# Patient Record
Sex: FEMALE | Race: WHITE | Employment: OTHER | URBAN - METROPOLITAN AREA
[De-identification: names, ages, dates, MRNs, and addresses within clinical notes are randomized per-mention and may not be internally consistent; named-entity substitution may affect disease eponyms.]

---

## 2021-01-28 ENCOUNTER — IMMUNIZATION (OUTPATIENT)
Dept: PRIMARY CARE CLINIC | Age: 86
End: 2021-01-28
Payer: MEDICARE

## 2021-01-28 PROCEDURE — 91300 COVID-19, PFIZER VACCINE 30MCG/0.3ML DOSE: CPT | Performed by: NURSE PRACTITIONER

## 2021-01-28 PROCEDURE — 0001A COVID-19, PFIZER VACCINE 30MCG/0.3ML DOSE: CPT | Performed by: NURSE PRACTITIONER

## 2021-02-18 ENCOUNTER — IMMUNIZATION (OUTPATIENT)
Dept: PRIMARY CARE CLINIC | Age: 86
End: 2021-02-18
Payer: MEDICARE

## 2021-02-18 PROCEDURE — 91300 COVID-19, PFIZER VACCINE 30MCG/0.3ML DOSE: CPT | Performed by: NURSE PRACTITIONER

## 2021-02-18 PROCEDURE — 0002A PR IMM ADMN SARSCOV2 30MCG/0.3ML DIL RECON 2ND DOSE: CPT | Performed by: NURSE PRACTITIONER

## 2022-10-27 ENCOUNTER — APPOINTMENT (OUTPATIENT)
Dept: CT IMAGING | Age: 87
End: 2022-10-27
Payer: MEDICARE

## 2022-10-27 ENCOUNTER — APPOINTMENT (OUTPATIENT)
Dept: GENERAL RADIOLOGY | Age: 87
End: 2022-10-27
Payer: MEDICARE

## 2022-10-27 ENCOUNTER — HOSPITAL ENCOUNTER (EMERGENCY)
Age: 87
Discharge: HOME | End: 2022-10-27
Attending: EMERGENCY MEDICINE
Payer: MEDICARE

## 2022-10-27 VITALS
HEART RATE: 104 BPM | RESPIRATION RATE: 16 BRPM | OXYGEN SATURATION: 97 % | WEIGHT: 160 LBS | BODY MASS INDEX: 27.31 KG/M2 | HEIGHT: 64 IN | DIASTOLIC BLOOD PRESSURE: 93 MMHG | TEMPERATURE: 96.8 F | SYSTOLIC BLOOD PRESSURE: 146 MMHG

## 2022-10-27 DIAGNOSIS — H53.9 VISUAL DISTURBANCE: Primary | ICD-10-CM

## 2022-10-27 PROCEDURE — 99284 EMERGENCY DEPT VISIT MOD MDM: CPT

## 2022-10-27 PROCEDURE — 71046 X-RAY EXAM CHEST 2 VIEWS: CPT

## 2022-10-27 PROCEDURE — 70450 CT HEAD/BRAIN W/O DYE: CPT

## 2022-10-27 ASSESSMENT — ENCOUNTER SYMPTOMS
RHINORRHEA: 0
CONSTIPATION: 0
EYE DISCHARGE: 0
EYE ITCHING: 0
NAUSEA: 0
PHOTOPHOBIA: 0
VOMITING: 0
DIARRHEA: 0
ABDOMINAL PAIN: 0
EYE PAIN: 0
EYE REDNESS: 0
COUGH: 0
SHORTNESS OF BREATH: 0

## 2022-10-27 ASSESSMENT — VISUAL ACUITY: OU: 1

## 2022-10-27 ASSESSMENT — PAIN - FUNCTIONAL ASSESSMENT: PAIN_FUNCTIONAL_ASSESSMENT: NONE - DENIES PAIN

## 2022-10-27 NOTE — ED PROVIDER NOTES
Nazia Arias is a 80 y.o. female    Chief Complaint   Patient presents with    Eye Problem     Vision changes has to keep 1 eye shut in order to see clearly         HPI   Nazia Arias is a 80 y.o. female presenting to the ED for Eye Problem (Vision changes has to keep 1 eye shut in order to see clearly)    History comes primarily from the patient. She is an 49-year-old female presenting for vision distortion starting this morning. Severity is mild. Closing 1 eye makes the symptoms better. He states this morning she experienced visual distortion that has been constant since onset. She is unable to clearly describe the distortion, but denies blurred vision, double vision, floaters. She states when she closes 1 eye or the other her vision clears. She denies trauma to the eyes. Denies trauma to the eye. Denies contact use but states she uses glasses for reading. She endorsed some mild right under eye pain and a mild frontal headache last night, however resolved now. Denies dizziness, shortness of breath, chest pain, abdominal pain, nausea, vomiting, fever, chills. Review of Systems   Constitutional:  Negative for chills and fever. HENT:  Negative for ear pain and rhinorrhea. Eyes:  Positive for visual disturbance. Negative for photophobia, pain, discharge, redness and itching. Respiratory:  Negative for cough and shortness of breath. Cardiovascular:  Negative for chest pain and palpitations. Gastrointestinal:  Negative for abdominal pain, constipation, diarrhea, nausea and vomiting. Genitourinary:  Negative for difficulty urinating and dysuria. Musculoskeletal:  Negative for arthralgias and myalgias. Skin:  Negative for rash. Neurological:  Negative for dizziness and headaches. All other systems reviewed and are negative. Physical Exam  Constitutional:       Appearance: Normal appearance. HENT:      Head: Normocephalic and atraumatic.       Nose: Nose normal.   Eyes: General: Lids are normal. Vision grossly intact. No visual field deficit. Right eye: No foreign body or discharge. Left eye: No foreign body or discharge. Extraocular Movements: Extraocular movements intact. Conjunctiva/sclera: Conjunctivae normal.      Pupils: Pupils are equal, round, and reactive to light. Comments: Snellen test: 20/20 right eye, 20/20 left eye, 20/20 both eyes   Cardiovascular:      Rate and Rhythm: Normal rate and regular rhythm. Pulmonary:      Breath sounds: Normal breath sounds. Abdominal:      General: Abdomen is flat. Palpations: Abdomen is soft. Tenderness: There is no abdominal tenderness. Musculoskeletal:         General: Normal range of motion. Cervical back: Normal range of motion. Skin:     General: Skin is warm and dry. Neurological:      General: No focal deficit present. Mental Status: She is alert and oriented to person, place, and time. Psychiatric:         Behavior: Behavior normal.        Procedures     MDM     Patient presented to the Emergency Department for Eye Problem (Vision changes has to keep 1 eye shut in order to see clearly)    She is an 27-year-old female presenting for vision distortion starting this morning. CT head shows no acute intracranial process. CXR shows no acute process. Patient with 20/20 vision in left eye, right eye, and both eyes together. No signs of trauma to either eyes. Vitals stable. Imaging was discussed with the patient. The plan for discharge with follow up with their PCP and ophthalmologist was discussed with the patient and she agrees with the plan. Return precautions were given. ED Course as of 10/27/22 2306   Thu Oct 27, 2022   1638 Patient is feeling well. Symptoms have unchanged. I discussed imaging results with the patient as well as the plan for discharge with follow up with an ophthalmologist this week. She agrees with the plan.   [KM]   [address] with Dr. Lissette Villalpando (ophthalmology) who is on call for Dr. Gavin Schaffer about the patient. He states to have the patient call their office to make an appointment and they will fit her into their schedule. [KM]      ED Course User Index  [KM] Jersey Mittal MD       --------------------------------------------- PAST HISTORY ---------------------------------------------  Past Medical History:  has a past medical history of Asthma, Chronic back pain, Displacement of lumbar intervertebral disc without myelopathy, Osteoporosis, PBC (primary biliary cirrhosis), and PVC (premature ventricular contraction). Past Surgical History:  has a past surgical history that includes Wrist surgery; Hysterectomy; lumbar laminectomy (2003); External ear surgery; and Cataract removal.    Social History:  reports that she has never smoked. She has never used smokeless tobacco. She reports that she does not drink alcohol and does not use drugs. Family History: family history includes Arthritis in her mother; Heart Disease in her father. The patients home medications have been reviewed. Allergies: Tape [adhesive tape]    -------------------------------------------------- RESULTS -------------------------------------------------  Labs:  No results found for this visit on 10/27/22. Radiology:  XR CHEST (2 VW)   Final Result   No acute process. Stable nodular density in the right upper lung field likely representing   vessel seen on end, calcified costal cartilage or summation of shadows. Consider oblique views and/or apical lordotic projection for further   evaluation. CT HEAD WO CONTRAST   Final Result   1. There is no acute intracranial abnormality. Specifically, there is no   intracranial hemorrhage.    2. Atrophy and periventricular leukomalacia,   .             ------------------------- NURSING NOTES AND VITALS REVIEWED ---------------------------  Date / Time Roomed:  10/27/2022  2:32 PM  ED Bed Assignment:  ERIC/ERIC    The nursing notes within the ED encounter and vital signs as below have been reviewed. BP (!) 146/93   Pulse (!) 104   Temp 96.8 °F (36 °C) (Temporal)   Resp 16   Ht 5' 4\" (1.626 m)   Wt 160 lb (72.6 kg)   SpO2 97%   BMI 27.46 kg/m²   Oxygen Saturation Interpretation: Normal      ------------------------------------------ PROGRESS NOTES ------------------------------------------  11:06 PM EDT  I have spoken with the patient and discussed todays results, in addition to providing specific details for the plan of care and counseling regarding the diagnosis and prognosis. Their questions are answered at this time and they are agreeable with the plan. I discussed at length with them reasons for immediate return here for re evaluation. They will followup with their  ophthalmology and primary care physician by calling their office tomorrow. --------------------------------- ADDITIONAL PROVIDER NOTES ---------------------------------  At this time the patient is without objective evidence of an acute process requiring hospitalization or inpatient management. They have remained hemodynamically stable throughout their entire ED visit and are stable for discharge with outpatient follow-up. The plan has been discussed in detail and they are aware of the specific conditions for emergent return, as well as the importance of follow-up. Discharge Medication List as of 10/27/2022  5:09 PM          Diagnosis:  1. Visual disturbance        Disposition:  Patient's disposition: Discharge to home  Patient's condition is stable. Strict return precautions were discussed including but not limited too new or worsening symtpoms. They verbalized understanding and were agreeable with the plan. All questions were answered and patient was discharged.        Romina Fleming MD  Resident  10/27/22 8130

## 2022-11-10 ENCOUNTER — HOSPITAL ENCOUNTER (OUTPATIENT)
Age: 87
Discharge: HOME | End: 2022-11-12

## 2022-11-10 PROCEDURE — 88305 TISSUE EXAM BY PATHOLOGIST: CPT
